# Patient Record
Sex: FEMALE | Race: WHITE
[De-identification: names, ages, dates, MRNs, and addresses within clinical notes are randomized per-mention and may not be internally consistent; named-entity substitution may affect disease eponyms.]

---

## 2019-03-23 ENCOUNTER — HOSPITAL ENCOUNTER (EMERGENCY)
Dept: HOSPITAL 74 - JERFT | Age: 44
Discharge: HOME | End: 2019-03-23
Payer: COMMERCIAL

## 2019-03-23 VITALS — SYSTOLIC BLOOD PRESSURE: 114 MMHG | HEART RATE: 81 BPM | DIASTOLIC BLOOD PRESSURE: 56 MMHG | TEMPERATURE: 98.2 F

## 2019-03-23 VITALS — BODY MASS INDEX: 25.6 KG/M2

## 2019-03-23 DIAGNOSIS — N76.0: Primary | ICD-10-CM

## 2019-03-23 NOTE — PDOC
History of Present Illness





- General


Chief Complaint: Pain


Stated Complaint: PELVIC PAIN


Time Seen by Provider: 03/23/19 12:12


History Source: Patient


Exam Limitations: No Limitations





- History of Present Illness


Initial Comments: 





03/23/19 13:11


44 year old female with no significant medical history and surgical history of 

right hand tendon surgery, present with vaginal irritation x 2 weeks.  Patient 

seen by ob/gyn treated with flagyl for bacterial vaginosis and monistat but 

reports irritation and burning persist in vagina.  Denies dysuria


Timing/Duration: reports: getting worse


Quality: reports: moderate


Pain Radiation: reports: no radiation


Activities at Onset: reports: no specific activity, sexual intercourse


Treatment  Prior to Arrive: improves with: analgesics


Aggravating Factors: improves with: None





Past History





- Past Medical History


Allergies/Adverse Reactions: 


 Allergies











Allergy/AdvReac Type Severity Reaction Status Date / Time


 


Penicillins Allergy   Verified 03/23/19 11:55











Home Medications: 


Ambulatory Orders





Doxycycline Monohydrate [Monodox] 100 mg PO Q12H #14 capsule 03/23/19 








COPD: No





- Immunization History


Immunization Up to Date: Yes





- Suicide/Smoking/Psychosocial Hx


Smoking History: Never smoked


Hx Alcohol Use: No


Drug/Substance Use Hx: No





Abd/GI Specific PMHX





- Complaint Specific PMHX


Colitis: No


Diverticulitis: No


Gall Bladder Disease: No


GERD: No


Hepatitis: No


Irritable Bowel Synd (IBS): No


Pancreatitis: No


GI Ulcer Disease: No





**Review of Systems





- Review of Systems


Able to Perform ROS?: Yes


Is the patient limited English proficient: No


Constitutional: No: Chills, Fever


Respiratory: No: Cough, Shortness of Breath, Wheezing


Cardiac (ROS): No: Chest Pain


ABD/GI: No: Abdominal Distended


: Yes: Burning, Other (vaginal irritation)


Musculoskeletal: No: Back Pain


Neurological: No: Headache, Numbness, Paresthesia





*Physical Exam





- Vital Signs


 Last Vital Signs











Temp Pulse Resp BP Pulse Ox


 


 98.2 F   81   16   114/56 L  99 


 


 03/23/19 11:55  03/23/19 11:55  03/23/19 11:55  03/23/19 11:55  03/23/19 11:55














- Physical Exam


General Appearance: Yes: Nourished, Appropriately Dressed


HEENT: positive: TMs Normal, Pharynx Normal


Neck: positive: Supple.  negative: Lymphadenopathy (R), Lymphadenopathy (L)


Respiratory/Chest: positive: Lungs Clear


Cardiovascular: positive: Regular Rhythm, Regular Rate


Female Pelvic Exam: positive: normal external exam, cervical os closed, other (+

whitish creamy vaginal discharge in cavity)


Extremity: positive: Normal Capillary Refill


Neurologic: positive: CNs II-XII NML intact, Fully Oriented, Alert





Moderate Sedation





- Procedure Monitoring


Vital Signs: 


Procedure Monitoring Vital Signs











Temperature  98.2 F   03/23/19 11:55


 


Pulse Rate  81   03/23/19 11:55


 


Respiratory Rate  16   03/23/19 11:55


 


Blood Pressure  114/56 L  03/23/19 11:55


 


O2 Sat by Pulse Oximetry (%)  99   03/23/19 11:55











Medical Decision Making





- Medical Decision Making





03/23/19 18:38


44 year old female with no significant medical history and surgical history of 

right hand tendon surgery, present with vaginal irritation x 2 weeks.





Plan: 


pelvic exam 


genital culture


diflucan given 


rx: doxycyline x 7 days 





*DC/Admit/Observation/Transfer


Diagnosis at time of Disposition: 


Vaginitis


Qualifiers:


 Chronicity: acute Qualified Code(s): N76.0 - Acute vaginitis








- Discharge Dispostion


Disposition: HOME


Condition at time of disposition: Good


Decision to Admit order: No





- Prescriptions


Prescriptions: 


Doxycycline Monohydrate [Monodox] 100 mg PO Q12H #14 capsule





- Referrals


Referrals: 


Simin Ricketts MD [Primary Care Provider] -  (follow up with gynecologist )





- Patient Instructions


Printed Discharge Instructions:  DI for Atrophic Vaginitis


Additional Instructions: 


Call gyn and follow up as needed


Return for worsening symptoms 





- Post Discharge Activity


Forms/Work/School Notes:  Back to Work

## 2021-01-02 ENCOUNTER — HOSPITAL ENCOUNTER (EMERGENCY)
Dept: HOSPITAL 74 - JER | Age: 46
LOS: 1 days | Discharge: HOME | End: 2021-01-03
Payer: COMMERCIAL

## 2021-01-02 VITALS — SYSTOLIC BLOOD PRESSURE: 109 MMHG | HEART RATE: 88 BPM | DIASTOLIC BLOOD PRESSURE: 65 MMHG

## 2021-01-02 VITALS — BODY MASS INDEX: 28.3 KG/M2

## 2021-01-02 DIAGNOSIS — R10.31: ICD-10-CM

## 2021-01-02 DIAGNOSIS — N83.209: ICD-10-CM

## 2021-01-02 DIAGNOSIS — N77.1: Primary | ICD-10-CM

## 2021-01-02 LAB
ALBUMIN SERPL-MCNC: 3.7 G/DL (ref 3.4–5)
ALP SERPL-CCNC: 68 U/L (ref 45–117)
ALT SERPL-CCNC: 18 U/L (ref 13–61)
ANION GAP SERPL CALC-SCNC: 4 MMOL/L (ref 8–16)
APPEARANCE UR: CLEAR
AST SERPL-CCNC: 16 U/L (ref 15–37)
BACTERIA # UR AUTO: 114 /UL (ref 0–1359)
BASOPHILS # BLD: 1.2 % (ref 0–2)
BILIRUB SERPL-MCNC: 0.2 MG/DL (ref 0.2–1)
BILIRUB UR STRIP.AUTO-MCNC: NEGATIVE MG/DL
BUN SERPL-MCNC: 9.9 MG/DL (ref 7–18)
CALCIUM SERPL-MCNC: 8.5 MG/DL (ref 8.5–10.1)
CASTS URNS QL MICRO: 0 /UL (ref 0–3.1)
CHLORIDE SERPL-SCNC: 108 MMOL/L (ref 98–107)
CO2 SERPL-SCNC: 27 MMOL/L (ref 21–32)
COLOR UR: YELLOW
CREAT SERPL-MCNC: 0.8 MG/DL (ref 0.55–1.3)
DEPRECATED RDW RBC AUTO: 13.7 % (ref 11.6–15.6)
EOSINOPHIL # BLD: 6 % (ref 0–4.5)
EPITH CASTS URNS QL MICRO: 17 /UL (ref 0–25.1)
GLUCOSE SERPL-MCNC: 101 MG/DL (ref 74–106)
HCT VFR BLD CALC: 36.1 % (ref 32.4–45.2)
HGB BLD-MCNC: 11.9 GM/DL (ref 10.7–15.3)
KETONES UR QL STRIP: NEGATIVE
LEUKOCYTE ESTERASE UR QL STRIP.AUTO: (no result)
LYMPHOCYTES # BLD: 22.2 % (ref 8–40)
MCH RBC QN AUTO: 28.7 PG (ref 25.7–33.7)
MCHC RBC AUTO-ENTMCNC: 32.9 G/DL (ref 32–36)
MCV RBC: 87.5 FL (ref 80–96)
MONOCYTES # BLD AUTO: 7.2 % (ref 3.8–10.2)
NEUTROPHILS # BLD: 63.4 % (ref 42.8–82.8)
NITRITE UR QL STRIP: NEGATIVE
PH UR: 7 [PH] (ref 5–8)
PLATELET # BLD AUTO: 280 K/MM3 (ref 134–434)
PMV BLD: 9 FL (ref 7.5–11.1)
PROT SERPL-MCNC: 7.2 G/DL (ref 6.4–8.2)
PROT UR QL STRIP: NEGATIVE
PROT UR QL STRIP: NEGATIVE
RBC # BLD AUTO: 16 /UL (ref 0–23.9)
RBC # BLD AUTO: 4.13 M/MM3 (ref 3.6–5.2)
SODIUM SERPL-SCNC: 138 MMOL/L (ref 136–145)
SP GR UR: 1.01 (ref 1.01–1.03)
UROBILINOGEN UR STRIP-MCNC: 0.2 MG/DL (ref 0.2–1)
WBC # BLD AUTO: 6.4 K/MM3 (ref 4–10)
WBC # UR AUTO: 8 /UL (ref 0–25.8)

## 2021-01-02 PROCEDURE — 3E0333Z INTRODUCTION OF ANTI-INFLAMMATORY INTO PERIPHERAL VEIN, PERCUTANEOUS APPROACH: ICD-10-PCS

## 2021-01-02 PROCEDURE — 3E033NZ INTRODUCTION OF ANALGESICS, HYPNOTICS, SEDATIVES INTO PERIPHERAL VEIN, PERCUTANEOUS APPROACH: ICD-10-PCS
